# Patient Record
Sex: FEMALE
[De-identification: names, ages, dates, MRNs, and addresses within clinical notes are randomized per-mention and may not be internally consistent; named-entity substitution may affect disease eponyms.]

---
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
## 2022-10-11 ENCOUNTER — NON-APPOINTMENT (OUTPATIENT)
Age: 22
End: 2022-10-11

## 2024-01-04 ENCOUNTER — NON-APPOINTMENT (OUTPATIENT)
Age: 24
End: 2024-01-04

## 2024-01-04 ENCOUNTER — APPOINTMENT (OUTPATIENT)
Dept: UROLOGY | Facility: CLINIC | Age: 24
End: 2024-01-04
Payer: MEDICAID

## 2024-01-04 DIAGNOSIS — N39.0 URINARY TRACT INFECTION, SITE NOT SPECIFIED: ICD-10-CM

## 2024-01-04 PROCEDURE — 51798 US URINE CAPACITY MEASURE: CPT

## 2024-01-04 PROCEDURE — 99204 OFFICE O/P NEW MOD 45 MIN: CPT | Mod: 25

## 2024-01-04 RX ORDER — SULFAMETHOXAZOLE AND TRIMETHOPRIM 800; 160 MG/1; MG/1
800-160 TABLET ORAL TWICE DAILY
Qty: 6 | Refills: 0 | Status: ACTIVE | COMMUNITY
Start: 2024-01-04 | End: 1900-01-01

## 2024-01-05 NOTE — ASSESSMENT
[FreeTextEntry1] : 23 year old with recurrent infections versus pelvic floor/bladder pain syndrome versus combination. Given recurrent hematuria and recent febrile UTI (though no culture) will perform work-up with CTU and cystoscopy. Will review prior culture data at next appointment which patient will bring. In interm, discussed increasing water intake, role of cranberry ppx, probiotics. Will determine need for abx ppx or hipprex based on data patient brings as as collect more data. If element of BPS, will start with avoidance bladder triggers and pelvic floor PT.  - UA, culture - CTU, cysto - extend bactrim x 3 days given liekly febrile UTI and symptoms still lingering (though discussed this may be post infection inflammation) - warning signs for which to seek emergency care discussed - cysto 1 month -- pelvic exam at cysto - bring culture data to next  appt

## 2024-01-05 NOTE — HISTORY OF PRESENT ILLNESS
[FreeTextEntry1] : Name CHUCKY ROSE  MRN [id number]   Aug 10 2000  Contact Number: [phone] ----------------------------------------------------------------------------------------------------------------------------------------- Date of First Visit: 24 Referring Provider/PCP: Dr. Sanjida Cabot f:[phone] -----------------------------------------------------------------------------------------------------------------------------------------  CC: chronic UTI  History of Present Illness: CHUCKY ROSE is a 23 year old female who presents for evaluation chronic UTIs. Patient reports over the past 3-4 years has had issues with urination. Patient reports burning after urination, urinary frequency -- these are usually present at baseline. Will take d-mannose and symptoms will improve. Sometimes worsening symptoms - will have worsening frequency, burning with urination, blood in urine and thats when patient more concerned about infection. Patient reports gets cultures checked when has blood in urine as knows thats when needs to get antibiotics.   This started around age 16 patient would get UTI symptoms from holding urine for too long - would get culture and have UTI x 2. Symptoms normal otherwise. Age 19 started sexual activity and developed baseline urinary symptoms. Sometimes would get culture and be negative or low amount of bacteria, patient would take d-mannose and symptoms would clear. Now baseline feels those symptoms, d-mannose helps, other time things progress and thats when gets culture and its positive. Unsure if exacerbations related to intercourse.  Patient unsure how many episodes a year that she has needed antibiotics. 2022 10-49K shukri novoa, and February last year had infection. then things were kept at bay. Last episode was 23 - patient had worsening symptoms, for the first time fever to 102 and left sided back pain. PCP prescribed Bactrim x 7 days. Saw PCP following day and got culture (after on abx) and by then fever and back pain were gone and told continue abx. Completed course yesterday. Still with burning and frequency. No more blood. But symptoms still feel worse than baseline.  # UTIs/year: unsure Culture proven: sometimes Febrile UTIs likely as above Stones: no Hematuria: as above  Prior treatments: course of antibiotics, d-mannose most days, at least 8 cups water daily  Bladder triggers: no caffeine, no tea, rare carbonation, moderate tomatoes, avoids citrus, avoids vinegars, +spicy foods   PVR (to ensure adequate emptying): 0  PMH: ADHD PSH: rhinoplasty Family History: family in [place] has issues with UTIs/bladder pain, father's aunt with bladder cancer Social: single, no children, , never smoker, social alcohol, no recreational drugs Meds: d-mannose, vivance Allergies: NKDA ROS: no fevers, chills, flank pain

## 2024-01-05 NOTE — LETTER BODY
[Dear  ___] : Dear  [unfilled], [Courtesy Letter:] : I had the pleasure of seeing your patient, [unfilled], in my office today. [Please see my note below.] : Please see my note below. [Consult Closing:] : Thank you very much for allowing me to participate in the care of this patient.  If you have any questions, please do not hesitate to contact me. [Sincerely,] : Sincerely, [FreeTextEntry3] : Carlene Benavides MD Director of Robotic Education [facility]   [email or web address] [phone]

## 2024-01-06 ENCOUNTER — TRANSCRIPTION ENCOUNTER (OUTPATIENT)
Age: 24
End: 2024-01-06

## 2024-01-07 LAB
APPEARANCE: CLEAR
BACTERIA: NEGATIVE /HPF
BILIRUBIN URINE: NEGATIVE
BLOOD URINE: NEGATIVE
CAST: 0 /LPF
COLOR: YELLOW
EPITHELIAL CELLS: 1 /HPF
GLUCOSE QUALITATIVE U: NEGATIVE MG/DL
KETONES URINE: NEGATIVE MG/DL
LEUKOCYTE ESTERASE URINE: NEGATIVE
MICROSCOPIC-UA: NORMAL
NITRITE URINE: NEGATIVE
PH URINE: 6.5
PROTEIN URINE: NEGATIVE MG/DL
RED BLOOD CELLS URINE: 0 /HPF
SPECIFIC GRAVITY URINE: <1.005
UROBILINOGEN URINE: 0.2 MG/DL
WHITE BLOOD CELLS URINE: 0 /HPF

## 2024-01-08 LAB — BACTERIA UR CULT: NORMAL

## 2024-01-23 ENCOUNTER — NON-APPOINTMENT (OUTPATIENT)
Age: 24
End: 2024-01-23

## 2024-01-25 ENCOUNTER — APPOINTMENT (OUTPATIENT)
Dept: UROLOGY | Facility: CLINIC | Age: 24
End: 2024-01-25

## 2024-01-27 ENCOUNTER — APPOINTMENT (OUTPATIENT)
Dept: CT IMAGING | Facility: IMAGING CENTER | Age: 24
End: 2024-01-27
Payer: MEDICAID

## 2024-01-27 ENCOUNTER — OUTPATIENT (OUTPATIENT)
Dept: OUTPATIENT SERVICES | Facility: HOSPITAL | Age: 24
LOS: 1 days | End: 2024-01-27
Payer: MEDICAID

## 2024-01-27 DIAGNOSIS — N39.0 URINARY TRACT INFECTION, SITE NOT SPECIFIED: ICD-10-CM

## 2024-01-27 PROCEDURE — 74178 CT ABD&PLV WO CNTR FLWD CNTR: CPT

## 2024-01-27 PROCEDURE — 74178 CT ABD&PLV WO CNTR FLWD CNTR: CPT | Mod: 26

## 2024-02-01 ENCOUNTER — NON-APPOINTMENT (OUTPATIENT)
Age: 24
End: 2024-02-01

## 2025-02-11 ENCOUNTER — NON-APPOINTMENT (OUTPATIENT)
Age: 25
End: 2025-02-11

## 2025-05-20 ENCOUNTER — NON-APPOINTMENT (OUTPATIENT)
Age: 25
End: 2025-05-20

## 2025-05-31 ENCOUNTER — NON-APPOINTMENT (OUTPATIENT)
Age: 25
End: 2025-05-31